# Patient Record
Sex: MALE | Race: WHITE | NOT HISPANIC OR LATINO | Employment: FULL TIME | URBAN - NONMETROPOLITAN AREA
[De-identification: names, ages, dates, MRNs, and addresses within clinical notes are randomized per-mention and may not be internally consistent; named-entity substitution may affect disease eponyms.]

---

## 2018-01-25 ENCOUNTER — OFFICE VISIT CONVERTED (OUTPATIENT)
Dept: FAMILY MEDICINE CLINIC | Age: 38
End: 2018-01-25
Attending: NURSE PRACTITIONER

## 2018-10-15 ENCOUNTER — CONVERSION ENCOUNTER (OUTPATIENT)
Dept: ORTHOPEDIC SURGERY | Facility: CLINIC | Age: 38
End: 2018-10-15

## 2018-10-15 ENCOUNTER — OFFICE VISIT CONVERTED (OUTPATIENT)
Dept: ORTHOPEDIC SURGERY | Facility: CLINIC | Age: 38
End: 2018-10-15
Attending: ORTHOPAEDIC SURGERY

## 2019-01-10 ENCOUNTER — HOSPITAL ENCOUNTER (OUTPATIENT)
Dept: OTHER | Facility: HOSPITAL | Age: 39
Discharge: HOME | End: 2019-01-10
Attending: NURSE PRACTITIONER

## 2019-01-10 LAB
CHOLEST SERPL-MCNC: 245 MG/DL (ref 107–200)
CHOLEST/HDLC SERPL: 6.6 {RATIO} (ref 3–6)
GLUCOSE SERPL-MCNC: 89 MG/DL (ref 70–110)
HDLC SERPL-MCNC: 37 MG/DL (ref 40–60)
LDLC SERPL CALC-MCNC: 144 MG/DL (ref 70–100)
TRIGL SERPL-MCNC: 322 MG/DL (ref 40–150)
VLDLC SERPL-MCNC: 64 MG/DL (ref 5–37)

## 2019-01-29 ENCOUNTER — OFFICE VISIT CONVERTED (OUTPATIENT)
Dept: FAMILY MEDICINE CLINIC | Age: 39
End: 2019-01-29
Attending: NURSE PRACTITIONER

## 2019-01-29 ENCOUNTER — HOSPITAL ENCOUNTER (OUTPATIENT)
Dept: OTHER | Facility: HOSPITAL | Age: 39
Discharge: HOME | End: 2019-01-29
Attending: NURSE PRACTITIONER

## 2019-01-29 LAB
BASOPHILS # BLD MANUAL: 0.09 10*3/UL (ref 0–0.2)
BASOPHILS NFR BLD MANUAL: 1 % (ref 0–3)
DEPRECATED RDW RBC AUTO: 41.2 FL
EOSINOPHIL # BLD MANUAL: 0.22 10*3/UL (ref 0–0.7)
EOSINOPHIL NFR BLD MANUAL: 2.3 % (ref 0–7)
ERYTHROCYTE [DISTWIDTH] IN BLOOD BY AUTOMATED COUNT: 12.6 % (ref 11.5–14.5)
GRANS (ABSOLUTE): 5.94 10*3/UL (ref 2–8)
GRANS: 63.5 % (ref 30–85)
HBA1C MFR BLD: 16.9 G/DL (ref 14–18)
HCT VFR BLD AUTO: 49.3 % (ref 42–52)
IMM GRANULOCYTES # BLD: 0.06 10*3/UL (ref 0–0.54)
IMM GRANULOCYTES NFR BLD: 0.6 % (ref 0–0.43)
LYMPHOCYTES # BLD MANUAL: 2.44 10*3/UL (ref 1–5)
LYMPHOCYTES NFR BLD MANUAL: 6.6 % (ref 3–10)
MCH RBC QN AUTO: 30.4 PG (ref 27–31)
MCHC RBC AUTO-ENTMCNC: 34.3 G/DL (ref 33–37)
MCV RBC AUTO: 88.7 FL (ref 80–96)
MONOCYTES # BLD AUTO: 0.62 10*3/UL (ref 0.2–1.2)
PLATELET # BLD AUTO: 238 10*3/UL (ref 130–400)
PMV BLD AUTO: 10.4 FL (ref 7.4–10.4)
RBC # BLD AUTO: 5.56 10*6/UL (ref 4.7–6.1)
TSH SERPL-ACNC: 3.06 M[IU]/L (ref 0.27–4.2)
VARIANT LYMPHS NFR BLD MANUAL: 26 % (ref 20–45)
WBC # BLD AUTO: 9.37 10*3/UL (ref 4.8–10.8)

## 2019-11-21 ENCOUNTER — HOSPITAL ENCOUNTER (OUTPATIENT)
Dept: OTHER | Facility: HOSPITAL | Age: 39
Discharge: HOME | End: 2019-11-21
Attending: NURSE PRACTITIONER

## 2019-11-21 LAB
CHOLEST SERPL-MCNC: 246 MG/DL (ref 107–200)
CHOLEST/HDLC SERPL: 7.5 {RATIO} (ref 3–6)
GLUCOSE SERPL-MCNC: 92 MG/DL (ref 70–110)
HDLC SERPL-MCNC: 33 MG/DL (ref 40–60)
LDLC SERPL CALC-MCNC: 172 MG/DL (ref 70–100)
TRIGL SERPL-MCNC: 207 MG/DL (ref 40–150)
VLDLC SERPL-MCNC: 41 MG/DL (ref 5–37)

## 2020-01-21 ENCOUNTER — OFFICE VISIT CONVERTED (OUTPATIENT)
Dept: FAMILY MEDICINE CLINIC | Age: 40
End: 2020-01-21
Attending: NURSE PRACTITIONER

## 2020-05-27 ENCOUNTER — OFFICE VISIT CONVERTED (OUTPATIENT)
Dept: FAMILY MEDICINE CLINIC | Age: 40
End: 2020-05-27
Attending: FAMILY MEDICINE

## 2020-09-09 ENCOUNTER — HOSPITAL ENCOUNTER (OUTPATIENT)
Dept: URGENT CARE | Facility: CLINIC | Age: 40
Discharge: HOME | End: 2020-09-09

## 2021-05-16 VITALS — BODY MASS INDEX: 34.4 KG/M2 | OXYGEN SATURATION: 98 % | HEIGHT: 70 IN | WEIGHT: 240.25 LBS | HEART RATE: 80 BPM

## 2021-05-18 NOTE — PROGRESS NOTES
Brandi Tucker 1980     Office/Outpatient Visit    Visit Date: Tue, Jan 29, 2019 03:55 pm    Provider: Fela Hurley N.P. (Assistant: Dimas Kerns)    Location: [facility]        Electronically signed by Fela Hurley N.P. on  01/30/2019 05:12:26 PM                             SUBJECTIVE:        CC:     jr Paz is a 39 year old White male.  physical         HPI:         Patient to be evaluated for health checkup.  His last physical exam was last year.  He's had vision screening done <6 months ago.  He performs testicular self-exams regularly.  He is current with his Td and influenza immunization.      Smoking Status:  Nonsmoker         PHQ-9 Depression Screening: Completed form scanned and in chart; Total Score 0 Alcohol Consumption Screening: Completed form scanned and in chart; Total Score 0     ROS:     CONSTITUTIONAL:  Positive for unintentional weight gain.   Negative for fatigue or fever.      E/N/T:  Negative for hearing problems, E/N/T pain, congestion, rhinorrhea, epistaxis, hoarseness, and dental problems.      CARDIOVASCULAR:  Negative for chest pain, palpitations, tachycardia, orthopnea, and edema.      RESPIRATORY:  Negative for cough, dyspnea, and hemoptysis.      GENITOURINARY:  Negative for dysuria, genital lesions, hematuria, impotence, polyuria, and changes in urine stream.      MUSCULOSKELETAL:  Negative for arthralgias, back pain, and myalgias.      NEUROLOGICAL:  Negative for dizziness, headaches, paresthesias, and weakness.      ENDOCRINE:  Negative for hair loss, heat/cold intolerance, polydipsia, and polyphagia.          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      Positive for Colon Cancer ( pat. uncle ) and Leukemia ( mother ).          Social History:         Marital Status:      Children: 2 children         Tobacco/Alcohol/Supplements:     Last Reviewed on 1/29/2019 03:58 PM by Dimas Kerns    Tobacco: He has a past history of cigarette smoking; quit date:  November 2017.              Current Problems:     Screening for diabetes mellitus     Screening for lipoid disorders         Immunizations:     Boostrix (Tdap) 4/6/2018         Allergies:     Last Reviewed on 1/25/2018 04:22 PM by Andra Laguerre    Penicillins:        Current Medications:     Last Reviewed on 1/29/2019 03:58 PM by Dimas Kerns      No Known Medications.         OBJECTIVE:        Vitals:         Historical:     01/25/2018  BP:   116/81 mm Hg ( (left arm, , sitting, );)     01/25/2018  Wt:   240.5lbs        Current: 1/29/2019 4:01:05 PM    Ht:  5 ft, 10 in;  Wt: 246.4 lbs;  BMI: 35.4    T: 98.8 F (oral);  BP: 139/91 mm Hg (right arm, sitting);  P: 93 bpm (right arm (BP Cuff), sitting)        Exams:     PHYSICAL EXAM:     GENERAL: no apparent distress;     EYES: PERRL, EOMI     E/N/T: EARS: bilateral TMs are normal;  NOSE: normal nasal mucosa; OROPHARYNX: posterior pharynx, including tonsils, tongue, and uvula are normal;     RESPIRATORY: normal respiratory rate and pattern with no distress; normal breath sounds with no rales, rhonchi, wheezes or rubs;     CARDIOVASCULAR: normal rate; rhythm is regular;  no edema;     GASTROINTESTINAL: nontender; normal bowel sounds;     GENITOURINARY: prostate: DECLINES     LYMPHATIC: no enlargement of cervical or facial nodes;     MUSCULOSKELETAL:  Normal range of motion, strength and tone;     NEUROLOGIC: mental status: alert and oriented x 3; GROSSLY INTACT     PSYCHIATRIC:  appropriate affect and demeanor; normal speech pattern; grossly normal memory;         ASSESSMENT           V70.0   Z00.00  Health checkup              DDx:     V79.0    Z13.89  Screening for depression              DDx:     272.4   E78.2  Hyperlipidemia              DDx:     V17.3   Z82.49  Family history of coronary artery disease              DDx:         ORDERS:         Lab Orders:       [id number]  [facility] CBC with 3 part diff  (Send-Out)         [id number]  TSH - [facility] TSH  (Send-Out)         FUTURE  Future order to be done at patients convenience  (Send-Out)         [id number]  [facility] Lipid Panel  (Send-Out)           Other Orders:         Depression screen negative  (In-House)                   PLAN:          Health checkup     LABORATORY:  Labs ordered to be performed today include CBC and TSH.      COUNSELING was provided today regarding the following topics: healthy eating habits, weight loss program, low cholesterol diet, low salt diet, regular exercise, and testicular self-exam.            Orders:       [id number]  [facility] CBC with 3 part diff  (Send-Out)         [id number]  TSH - [facility] TSH  (Send-Out)             Patient Education Handouts:       Physical Exam 40-49 year, Male           Screening for depression     MIPS PHQ-9 Depression Screening Completed form scanned and in chart; Total Score 0           Orders:         Depression screen negative  (In-House)            Hyperlipidemia         RECOMMENDATIONS given include: alcohol avoidance, exercise, low cholesterol/low fat diet, weight loss, and declines statin tx at this time.  wants to work on diet and exercise and repeat fasting lipid panel in 2 months lab only.      FOLLOW-UP TESTING #1: FOLLOW-UP LABORATORY:  Labs to be scheduled in the future include lipid panel.   Patient to schedule in 2 months.            Orders:       FUTURE  Future order to be done at patients convenience  (Send-Out)         [id number]  [facility] Lipid Panel  (Send-Out)            Family history of coronary artery disease discuss risk factors. need to tx for HTN if bp not 130s over 80s or less.  verbalizes understanding.             Patient  Recommendations:        For  Health checkup:     Limit dietary intake of fat (especially saturated fat) and cholesterol.  Eat a variety of foods, including plenty of fruits, vegetables, and grain containg fiber, limit fat intake to 30% of total calories. Balance caloric intake with energy expended. Maintaining regular physical activity is advised to help prevent heart disease, hypertension, diabetes, and obesity.    Testicular cancer is the #1 cancer in men ages 15-35.  You should regularly examine your testicles for knots, lumps, or tenderness. Testicular pain, even if not associated with any masses, needs to be evaluated by your doctor!          For  Hyperlipidemia:     Avoid alcohol as it can contribute to elevated blood pressure. Maintain a regular exercise program. Reduce the amount of cholesterol and saturated fat in your diet. Try to lose some weight; even modest weight reduction can improve your blood pressure.          The following laboratory testing has been ordered: lipid panel Schedule the above testing in 2 months.              CHARGE CAPTURE           **Please note: ICD descriptions below are intended for billing purposes only and may not represent clinical diagnoses**        Primary Diagnosis:         V70.0 Health checkup            Z00.00    Encounter for general adult medical examination without abnormal findings              Orders:          [id number]   Preventive medicine, established patient, age 40-64 years  (In-House)           V79.0 Screening for depression            Z13.89    Encounter for screening for other disorder              Orders:             Depression screen negative  (In-House)           272.4 Hyperlipidemia            E78.2    Mixed hyperlipidemia    V17.3 Family history of coronary artery disease            Z82.49    Family history of ischemic heart disease and other diseases of the circulatory system

## 2021-05-18 NOTE — PROGRESS NOTES
Brandi [place]annapolis 1980     Office/Outpatient Visit    Visit Date: Thu, Jan 25, 2018 04:19 pm    Provider: Fela Hurley N.P. (Assistant: Andra Laguerre MA)    Location: [facility]        Electronically signed by Fela Hurley N.P. on  01/27/2018 07:24:44 PM                             SUBJECTIVE:        CC:     Mr. Paz is a 38 year old male.  Patient is here for yearly PE.          HPI:         Mr. Paz is here for routine periodic health screening and examination.  His last physical exam was 1 year ago.  He has never had a chest x-ray. He has never had an ECG. A hearing test was done 5 years ago and results were normal.   He's had vision screening done 25 years ago and this was normal.      Physical Activity: ** Exercises infrequently; physical job; He is not current with his Td and influenza immunization.      ROS:     CONSTITUTIONAL:  Negative for chills, fatigue, fever, and weight change.      EYES:  Negative for blurred vision, eye pain, and photophobia.      E/N/T:  Negative for hearing problems, E/N/T pain, congestion, rhinorrhea, epistaxis, hoarseness, and dental problems.      CARDIOVASCULAR:  Negative for chest pain, palpitations, tachycardia, orthopnea, and edema.      RESPIRATORY:  Negative for cough, dyspnea, and hemoptysis.      GASTROINTESTINAL:  Positive for acid reflux symptoms ( tums, has seen GI,  constipation  PPis and zantac,  feels as if symptms are stable ).   Negative for constipation, diarrhea, nausea or vomiting.      GENITOURINARY:  Negative for dysuria, genital lesions, hematuria, impotence, polyuria, and changes in urine stream.      MUSCULOSKELETAL:  Negative for arthralgias, back pain, and myalgias.      INTEGUMENTARY:  Negative for rash.      NEUROLOGICAL:  Negative for dizziness, headaches, paresthesias, and weakness.      ENDOCRINE:  Negative for hair loss, heat/cold intolerance, polydipsia, and polyphagia.      ALLERGIC/IMMUNOLOGIC:  Negative for  seasonal allergies.      PSYCHIATRIC:  Negative for anxiety, depression, feelings of stress and sleep disturbance.          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      Positive for Leukemia ( mother ).          Social History:         Marital Status:      Children: 2 children         Tobacco/Alcohol/Supplements:     Last Reviewed on 1/25/2018 04:23 PM by Andra Laguerre    Tobacco: He has a past history of cigarette smoking; quit date:  November 2017.              Current Problems:     Screening for diabetes mellitus     Screening for lipoid disorders         Immunizations:     None        Allergies:     Last Reviewed on 1/25/2018 04:22 PM by Andra Laguerre    Penicillins:        Current Medications:     Last Reviewed on 1/25/2018 04:22 PM by Andra Laguerre    None        OBJECTIVE:        Vitals:         Current: 1/25/2018 4:21:49 PM    Ht:  5 ft, 10 in;  Wt: 240.5 lbs;  BMI: 34.5    T: 98.5 F (oral);  BP: 116/81 mm Hg (left arm, sitting);  P: 101 bpm (left arm (BP Cuff), sitting)        Exams:     PHYSICAL EXAM:     GENERAL:  well developed and nourished; appropriately groomed; in no apparent distress;     EYES: PERRL, EOMI     E/N/T: EARS: both TMs are have fluid behind them;  NOSE: normal nasal mucosa; OROPHARYNX: posterior pharynx shows 2+ tonsils;     RESPIRATORY: normal respiratory rate and pattern with no distress; normal breath sounds with no rales, rhonchi, wheezes or rubs;     CARDIOVASCULAR: normal rate; rhythm is regular;  no edema;     GASTROINTESTINAL: nontender; normal bowel sounds;     LYMPHATIC: no enlargement of cervical or facial nodes;     MUSCULOSKELETAL:  Normal  range of motion, strength and tone;     NEUROLOGIC: mental status: alert and oriented x 3; GROSSLY INTACT     PSYCHIATRIC:  appropriate affect and demeanor; normal speech pattern; grossly normal memory;         ASSESSMENT           V70.0   Z00.00  Annual physical              DDx:         ORDERS:         Other Orders:       [id number]  Behav assmt w/score & docd/stand instrument  (In-House)                   PLAN:          Annual physical     MIPS PHQ-9 Depression Screening: Completed form scanned and in chart; Total Score 0     COUNSELING was provided today regarding the following topics: healthy eating habits, low cholesterol diet, regular exercise, and consider wellbutrin or chantix for smoking cessation.  3-10 minutes on smoking cessation counseling..            Orders:       [id number]  Behav assmt w/score & docd/stand instrument  (In-House)             Patient Education Handouts:       Physical Exam 30-39 year, Male        Heartburn (Gerd)        Smoking Cessation              Patient Recommendations:        For  Annual physical:     Limit dietary intake of fat (especially saturated fat) and cholesterol.  Eat a variety of foods, including plenty of fruits, vegetables, and grain containg fiber, limit fat intake to 30% of total calories. Balance caloric intake with energy expended. Maintaining regular physical activity is advised to help prevent heart disease, hypertension, diabetes, and obesity.              CHARGE CAPTURE           **Please note: ICD descriptions below are intended for billing purposes only and may not represent clinical diagnoses**        Primary Diagnosis:         V70.0 Annual physical            Z00.00    Encntr for general adult medical exam w/o abnormal findings              Orders:          [id number]   Smoking and tobacco use cessation counseling visit; intermediate, > 3 minutes up to 10 minutes  (In-House)             88215   Preventive medicine, established patient, age 18-39 years  (In-House)              09594   Behav assmt w/score & docd/stand instrument  (In-House)

## 2021-05-18 NOTE — PROGRESS NOTES
Darrell Paz  1980     Office/Outpatient Visit    Visit Date: Wed, May 27, 2020 03:42 pm    Provider: Krish Almanzar MD (Assistant: Dimas Kerns, )    Location: [facility]        Electronically signed by Krish Almanzar MD on  08/12/2020 06:24:33 PM                             Subjective:        CC: jr Paz is a 40 year old White male.  presents today due to tick bite on scrotum, thinks he got it on sunday         HPI:       Darrell presents to clinic today for pain and swelling after a tick bite.  He was bitten by a tick on the scrotum on Sunday and since that time he is developed redness and swelling of his scrotum that has extended into his groin and his proximal thigh.  It is minimally tender and nonpruritic.  There are no open lesions or drainage.  He has no fever or chills.  He has had a similar reaction to a tick bite in the past.    ROS:     CONSTITUTIONAL:  Negative for chills, fatigue, fever, and weight change.      EYES:  Negative for blurred vision.      CARDIOVASCULAR:  Negative for chest pain, dizziness, palpitations and edema.      RESPIRATORY:  Negative for dyspnea and cough.      GASTROINTESTINAL:  Negative for abdominal pain, diarrhea, nausea and vomiting.      MUSCULOSKELETAL:  Negative for arthralgias and myalgias.      INTEGUMENTARY:  Positive for Scrotal and groin redness and swelling status post recent tick bite.      NEUROLOGICAL:  Negative for headaches, paresthesias and weakness.          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      Positive for Colon Cancer ( pat. uncle ) and Leukemia ( mother ).          " Social History:         Marital Status:      Children: 2 children         Tobacco/Alcohol/Supplements:     Last Reviewed on 8/12/2020 06:24 PM by Krish Almanzar    Tobacco: He has a past history of cigarette smoking; quit date:  November 2017.          Substance Abuse History:     Last Reviewed on 8/12/2020 06:24 PM by Krish Almanzar        Mental Health History:     Last Reviewed on 8/12/2020 06:24 PM by Krish Almanzar        Communicable Diseases (eg STDs):     Last Reviewed on 8/12/2020 06:24 PM by Krish Almanzar        Current Problems:     Last Reviewed on 8/12/2020 06:24 PM by Krish Almanzar    Encounter for screening for lipoid disorders    Family history of ischemic heart disease and other diseases of the circulatory system    Mixed hyperlipidemia    Hyperlipidemia, unspecified    Pain in right leg    Pain in left leg    Pain in right foot    Other fatigue    Encounter for general adult medical examination without abnormal findings    Encounter for screening for depression    Bitten or stung by nonvenomous insect and other nonvenomous arthropods, initial encounter        Immunizations:     influenza, injectable, quadrivalent, preservative free 1/7/2020    Fluzone Quadrivalent (3+ years) 11/29/2018    Boostrix (Tdap) 4/6/2018        Allergies:     Last Reviewed on 8/12/2020 06:24 PM by Krish Almanzar    Penicillins:          Current Medications:     Last Reviewed on 8/12/2020 06:24 PM by Krish Almanzar    No Known Medications.    meloxicam 7.5 mg oral tablet        Objective:        Vitals:         Current: 5/27/2020 3:45:02 PM    Ht:  5 ft, 10 in;  Wt: 234.2 lbs;  BMI: 33.6T: 98.5 F (oral);  BP: 131/94 mm Hg (left arm, sitting);  P: 91 bpm (left arm (BP Cuff), sitting)        Exams:     PHYSICAL EXAM:     GENERAL: Vitals recorded well developed, well nourished;  no apparent distress;     EYES: conjunctiva and cornea are normal;     RESPIRATORY: Clear to auscultation bilateally; no rales (\"crackles\") " present; no rhonchi; no wheezes;     CARDIOVASCULAR: normal rate; rhythm is regular;  No murmurs, clicks, gallops or rubs appreciated; no edema;     SKIN: Erythema and mild edema of left hemiscrotum extending to the left inguinal region and proximal thigh. There is no fluctuance.;     NEUROLOGIC: mental status: alert and oriented x 3; Grossly intact;     PSYCHIATRIC: appropriate affect and demeanor; normal speech pattern; Normal behavior;         Assessment:         W57.XXXA   Bitten or stung by nonvenomous insect and other nonvenomous arthropods, initial encounter           ORDERS:         Meds Prescribed:       [New Rx] doxycycline monohydrate 100 mg oral capsule [take 1 capsule (100 mg) by oral route 2 times per day], #28 (twenty eight) capsules, Refills: 0 (zero)                 Plan:         Bitten or stung by nonvenomous insect and other nonvenomous arthropods, initial encounter- Differential diagnosis includes cellulitis versus allergic reaction to tick bite.  Will treat for cellulitis at this time with doxycycline as this would cover both cellulitis and any potential Lyme disease.  If no improvement of the rash within 48 to 72 hours, will consider prednisone for possible allergic reaction.  ED/return precautions given.          Prescriptions:       [New Rx] doxycycline monohydrate 100 mg oral capsule [take 1 capsule (100 mg) by oral route 2 times per day], #28 (twenty eight) capsules, Refills: 0 (zero)             Charge Capture:         Primary Diagnosis:     W57.XXXA  Bitten or stung by nonvenomous insect and other nonvenomous arthropods, initial encounter           Orders:      [id number]  Office/outpatient visit; established patient, level 3  (In-House)

## 2021-05-18 NOTE — PROGRESS NOTES
Darrell Paz  1980     Office/Outpatient Visit    Visit Date: Tue, Jan 21, 2020 12:05 pm    Provider: Fela Hurley N.P. (Assistant: Spurling, Sarah C, MA)    Location: [facility]        Electronically signed by Fela Hurley N.P. on  01/21/2020 08:29:55 PM                             Subjective:        CC: jr Paz is a 40 year old White male.  PREVENATIVE EXAM.          HPI:           Patient to be evaluated for encounter for general adult medical examination without abnormal findings.  His last physical exam was 1 year ago.  He underwent 10 years ago with normal results.   He's had vision screening done in 2019 and this was abnormal, but is corrected with glasses.  He is current with his Td and influenza immunization.      Smoking status: Former smoker           PHQ-9 Depression Screening: Completed form scanned and in chart; Total Score 2     ROS:     CONSTITUTIONAL:  Positive for fatigue ( moderate ).   Negative for fever.      EYES:  Positive for use of glasses or contacts and not wearing currently.      E/N/T:  Negative for hearing problems, E/N/T pain, congestion, rhinorrhea, epistaxis, hoarseness, and dental problems.      RESPIRATORY:  Negative for cough, dyspnea, and hemoptysis.      GASTROINTESTINAL:  Positive for acid reflux symptoms ( tried many meds,  saw GI dr romero.  stable but not resolved.  eats once daily as a result. ) and diarrhea ( since cholecystectomy ).   Negative for dysphagia, constipation, hematochezia, nausea or vomiting.      GENITOURINARY:  Negative for dysuria, genital lesions, hematuria, impotence, polyuria, and changes in urine stream.      MUSCULOSKELETAL:  Positive for limb pain ( bilateral leg pain ) and right foot pain along arch leading to heel.  denies injury..   Negative for myalgias.      INTEGUMENTARY:  Negative for rash.      NEUROLOGICAL:  Negative for dizziness, headaches, paresthesias, and weakness.      PSYCHIATRIC:  Negative for  anxiety, depression, and sleep disturbances.          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      Positive for Colon Cancer ( pat. uncle ) and Leukemia ( mother ).          Social History:         Marital Status:      Children: 2 children         Tobacco/Alcohol/Supplements:     Last Reviewed on 1/21/2020 12:09 PM by Spurling, Sarah C    Tobacco: He has a past history of cigarette smoking; quit date:  November 2017.          Current Problems:     Last Reviewed on 1/21/2020 12:06 PM by Spurling, Sarah C    Encounter for screening for lipoid disorders    Mixed hyperlipidemia    Family history of ischemic heart disease and other diseases of the circulatory system        Immunizations:     Fluzone Quadrivalent (3+ years) 11/29/2018    Boostrix (Tdap) 4/6/2018        Allergies:     Last Reviewed on 1/21/2020 12:06 PM by Spurling, Sarah C    Penicillins:          Current Medications:     Last Reviewed on 1/21/2020 12:06 PM by Spurling, Sarah C    No Known Medications.        Objective:        Vitals:         Historical:     1/29/2019  BP:   139/91 mm Hg ( (right arm, , sitting, );) 1/29/2019  Wt:   246.4lbs    Current: 1/21/2020 12:11:49 PM    Ht:  5 ft, 10 in;  Wt: 253.8 lbs;  BMI: 36.4T: 98.5 F (oral);  BP: 135/90 mm Hg (left arm, sitting);  P: 77 bpm (left arm (BP Cuff), sitting)        Repeat:     12:41:20 PM  BP:   128/88mm Hg (right arm, sitting)     Exams:     PHYSICAL EXAM:     GENERAL: obese;  well groomed;  no apparent distress;     EYES: PERRL, EOMI     E/N/T: EARS: bilateral TMs are normal;  NOSE: normal nasal mucosa; OROPHARYNX: posterior  pharynx, including tonsils, tongue, and uvula are normal;     RESPIRATORY: normal respiratory rate and pattern with no distress; normal breath sounds with no rales, rhonchi, wheezes or rubs;     CARDIOVASCULAR: normal rate; rhythm is regular;     GASTROINTESTINAL: nontender; normal bowel sounds; no organomegaly;     GENITOURINARY: prostate: DECLINES     LYMPHATIC: no enlargement of cervical or facial nodes;     MUSCULOSKELETAL: normal gait; normal range of motion of all major muscle groups; no limb or joint pain with range of motion; normal overall tone declines right foot exam;     NEUROLOGIC: mental status: alert and oriented x 3; GROSSLY INTACT     PSYCHIATRIC:  appropriate affect and demeanor; normal speech pattern; grossly normal memory;         Assessment:         Z00.00   Encounter for general adult medical examination without abnormal findings       Z13.31   Encounter for screening for depression       M79.671   Pain in right foot       M79.604   Pain in right leg       M79.605   Pain in left leg       R53.83   Other fatigue       E78.5   Hyperlipidemia, unspecified           ORDERS:         Lab Orders:       FUTURE  Future order to be done at patients convenience  (Send-Out)            55985  LPDP - [facility] Lipid Panel  (Send-Out)            FUTURE  Future order to be done at patients convenience  (Send-Out)            48586  VB12 - [facility] Vitamin B12  (Send-Out)            FUTURE  Future order to be done at patients convenience  (Send-Out)            16358  VITD - [facility] Vitamin D, 25 Hydroxy  (Send-Out)              Other Orders:         Depression screen negative  (In-House)                      Plan: colonosocpy dr romero 10 yrs ago        Encounter for general adult medical examination without abnormal findings        COUNSELING was provided today regarding the following topics: healthy eating habits, weight loss program, low cholesterol diet, low salt diet, and regular exercise.            Patient  Education Handouts:       Physical Exam 40-49 year, Male          Encounter for screening for depression    MIPS PHQ-9 Depression Screening: Completed form scanned and in chart; Total Score 2; Negative Depression Screen           Orders:         Depression screen negative  (In-House)              Pain in right foot        RECOMMENDATIONS given include: discuss conservatibve measures for plantar fasciitis.  declines rx oral or topical nsaid.  consider xray right foot and referral to podiatry.  declines..            Patient Education Handouts:       Plantar Fasciitis          Pain in left leghigh risk for vitamin deficiency due to chronic GI complaints.        FOLLOW-UP TESTING #1: FOLLOW-UP LABORATORY:  Labs to be scheduled in the future include Vitamin D 25.            Orders:       FUTURE  Future order to be done at patients convenience  (Send-Out)            20449  VITD - [facility] Vitamin D, 25 Hydroxy  (Send-Out)              Other fatigue        FOLLOW-UP TESTING #1: FOLLOW-UP LABORATORY:  Labs to be scheduled in the future include Vitamin B12.            Orders:       FUTURE  Future order to be done at patients convenience  (Send-Out)            [address]  [address] Vitamin B12  (Send-Out)              Hyperlipidemia, unspecified        FOLLOW-UP TESTING #1: FOLLOW-UP LABORATORY:  Labs to be scheduled in the future include lipid panel.   Patient to schedule in 2 months.      RECOMMENDATIONS given include: alcohol avoidance, exercise, low cholesterol/low fat diet, weight loss, and stong family hx.  cx risk 3.6%.  due to family hx strongly recommend pravastatin or could try colestipol which may also help GI symptoms.  pt decliens today.  will consider..            Orders:       FUTURE  Future order to be done at patients convenience  (Send-Out)            08991  [facility] Lipid Panel  (Send-Out)                  Patient Recommendations:        For  Encounter for general adult medical examination without abnormal  findings:    Limit dietary intake of fat (especially saturated fat) and cholesterol.  Eat a variety of foods, including plenty of fruits, vegetables, and grain containg fiber, limit fat intake to 30% of total calories. Balance caloric intake with energy expended. Maintaining regular physical activity is advised to help prevent heart disease, hypertension, diabetes, and obesity.          For  Pain in left leg:            The following laboratory testing has been ordered:         For  Other fatigue:            The following laboratory testing has been ordered:         For  Hyperlipidemia, unspecified:            The following laboratory testing has been ordered: lipid panel Schedule the above testing in 2 months.  Avoid alcohol as it can contribute to elevated blood pressure. Maintain a regular exercise program. Reduce the amount of cholesterol and saturated fat in your diet. Try to lose some weight; even modest weight reduction can improve your blood pressure.              Charge Capture:         Primary Diagnosis:     Z00.00  Encounter for general adult medical examination without abnormal findings           Orders:      32457  Preventive medicine, established patient, age 40-64 years  (In-House)              Z13.31  Encounter for screening for depression           Orders:      65389-47  Office/outpatient visit; established patient, level 3  (In-House)              Depression screen negative  (In-House)              M79.671  Pain in right foot     M79.604  Pain in right leg     M79.605  Pain in left leg     R53.83  Other fatigue     E78.5  Hyperlipidemia, unspecified

## 2021-07-01 VITALS
HEIGHT: 70 IN | TEMPERATURE: 98.5 F | HEART RATE: 101 BPM | DIASTOLIC BLOOD PRESSURE: 81 MMHG | SYSTOLIC BLOOD PRESSURE: 116 MMHG | WEIGHT: 240.5 LBS | BODY MASS INDEX: 34.43 KG/M2

## 2021-07-01 VITALS
HEIGHT: 70 IN | BODY MASS INDEX: 35.28 KG/M2 | WEIGHT: 246.4 LBS | HEART RATE: 93 BPM | DIASTOLIC BLOOD PRESSURE: 91 MMHG | TEMPERATURE: 98.8 F | SYSTOLIC BLOOD PRESSURE: 139 MMHG

## 2021-07-02 VITALS
HEIGHT: 70 IN | BODY MASS INDEX: 33.53 KG/M2 | WEIGHT: 234.2 LBS | HEART RATE: 91 BPM | SYSTOLIC BLOOD PRESSURE: 131 MMHG | DIASTOLIC BLOOD PRESSURE: 94 MMHG | TEMPERATURE: 98.5 F

## 2021-07-02 VITALS
HEIGHT: 70 IN | HEART RATE: 77 BPM | SYSTOLIC BLOOD PRESSURE: 128 MMHG | DIASTOLIC BLOOD PRESSURE: 88 MMHG | WEIGHT: 253.8 LBS | BODY MASS INDEX: 36.33 KG/M2 | TEMPERATURE: 98.5 F

## 2021-08-13 ENCOUNTER — HOSPITAL ENCOUNTER (OUTPATIENT)
Dept: GENERAL RADIOLOGY | Facility: HOSPITAL | Age: 41
Discharge: HOME | End: 2021-08-13
Admitting: NURSE PRACTITIONER

## 2021-08-13 ENCOUNTER — OFFICE VISIT (OUTPATIENT)
Dept: FAMILY MEDICINE CLINIC | Age: 41
End: 2021-08-13

## 2021-08-13 VITALS
BODY MASS INDEX: 32.44 KG/M2 | DIASTOLIC BLOOD PRESSURE: 84 MMHG | HEART RATE: 91 BPM | TEMPERATURE: 99.4 F | WEIGHT: 226.6 LBS | SYSTOLIC BLOOD PRESSURE: 140 MMHG | HEIGHT: 70 IN

## 2021-08-13 DIAGNOSIS — M79.632 PAIN OF LEFT FOREARM: Primary | ICD-10-CM

## 2021-08-13 DIAGNOSIS — I10 ESSENTIAL HYPERTENSION: ICD-10-CM

## 2021-08-13 DIAGNOSIS — M79.632 PAIN OF LEFT FOREARM: ICD-10-CM

## 2021-08-13 PROBLEM — M79.602 PAIN OF LEFT UPPER EXTREMITY: Status: ACTIVE | Noted: 2021-08-13

## 2021-08-13 PROBLEM — K21.9 ESOPHAGEAL REFLUX: Status: ACTIVE | Noted: 2021-08-13

## 2021-08-13 PROCEDURE — 73090 X-RAY EXAM OF FOREARM: CPT

## 2021-08-13 PROCEDURE — 99213 OFFICE O/P EST LOW 20 MIN: CPT | Performed by: NURSE PRACTITIONER

## 2021-08-13 PROCEDURE — 96372 THER/PROPH/DIAG INJ SC/IM: CPT | Performed by: NURSE PRACTITIONER

## 2021-08-13 RX ORDER — KETOROLAC TROMETHAMINE 30 MG/ML
60 INJECTION, SOLUTION INTRAMUSCULAR; INTRAVENOUS ONCE
Status: COMPLETED | OUTPATIENT
Start: 2021-08-13 | End: 2021-08-13

## 2021-08-13 RX ORDER — METHYLPREDNISOLONE 4 MG/1
TABLET ORAL
Qty: 21 TABLET | Refills: 0 | Status: SHIPPED | OUTPATIENT
Start: 2021-08-13 | End: 2022-08-08

## 2021-08-13 RX ORDER — LISINOPRIL 10 MG/1
10 TABLET ORAL DAILY
Qty: 30 TABLET | Refills: 5 | OUTPATIENT
Start: 2021-08-13 | End: 2022-01-17

## 2021-08-13 RX ADMIN — KETOROLAC TROMETHAMINE 60 MG: 30 INJECTION, SOLUTION INTRAMUSCULAR; INTRAVENOUS at 17:08

## 2021-08-13 NOTE — PROGRESS NOTES
"Chief Complaint  Arm Pain (Left)    Subjective  Darrell is a 41-year-old male here today for left lower arm pain x2 months.  No known injury.  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  No interventions have been attempted.  He states his blood pressure also tends to stay a little elevated similar to when he first arrived in the office today and has a strong family history of hypertension.  He denies a high salt diet.  Would like to get treatment for hypertension.        Darrell Paz presents to [facility]    Review of Systems   Constitutional: Negative.    Respiratory: Negative.    Cardiovascular: Negative.    Musculoskeletal: Positive for arthralgias and myalgias.   Neurological: Negative for dizziness.        Objective   Vital Signs:   Vitals:    08/13/21 1515 08/13/21 1549   BP: 149/99 140/84   BP Location: Right arm Right arm   Patient Position: Sitting Sitting   Cuff Size:  Large Adult   Pulse: 91    Temp: 99.4 °F (37.4 °C)    TempSrc: Oral    Weight: 103 kg (226 lb 9.6 oz)    Height: 177.8 cm (70\")       Physical Exam  Vitals reviewed.   Constitutional:       General: He is not in acute distress.     Appearance: Normal appearance. He is well-developed.   Cardiovascular:      Rate and Rhythm: Normal rate and regular rhythm.      Pulses:           Radial pulses are 2+ on the left side.      Heart sounds: Normal heart sounds.   Pulmonary:      Effort: Pulmonary effort is normal.      Breath sounds: Normal breath sounds.   Musculoskeletal:      Right lower leg: No edema.      Left lower leg: No edema.   Skin:     General: Skin is warm and dry.   Neurological:      General: No focal deficit present.      Mental Status: He is alert.   Psychiatric:         Attention and Perception: Attention normal.         Mood and Affect: Mood and affect normal.         Behavior: Behavior normal.          Result Review :                Assessment and Plan "    Diagnoses and all orders for this visit:    1. Pain of left forearm (Primary)  Assessment & Plan:  Rest and ice and take medicines as directed.  X-ray is pending.  Consider physical therapy or referral to orthopedics if not improving.    Orders:  -     XR Forearm 2 View Left; Future  -     ketorolac (TORADOL) injection 60 mg  -     methylPREDNISolone (MEDROL) 4 MG dose pack; Take as directed on package instructions.  Dispense: 21 tablet; Refill: 0    2. Essential hypertension  Assessment & Plan:  Advise a lower sodium diet and monitor blood pressure at home.  We will start lisinopril 10 mg once daily.  Report dry cough if it were to occur.  And follow-up in 6 weeks for reevaluation or sooner if concerns    Orders:  -     lisinopril (PRINIVIL,ZESTRIL) 10 MG tablet; Take 1 tablet by mouth Daily.  Dispense: 30 tablet; Refill: 5      Follow Up    Return in about 6 weeks (around 9/24/2021).  Patient was given instructions and counseling regarding his condition or for health maintenance advice. Please see specific information pulled into the AVS if appropriate.

## 2021-08-13 NOTE — PATIENT INSTRUCTIONS
Tennis Elbow  Tennis elbow is swelling (inflammation) in your outer forearm, near your elbow. Swelling affects the tissues that connect muscle to bone (tendons). Tennis elbow can happen in any sport or job in which you use your elbow too much. It is caused by doing the same motion over and over. Tennis elbow can cause:  · Pain and tenderness in your forearm and the outer part of your elbow. You may have pain all the time, or only when using the arm.  · A burning feeling. This runs from your elbow through your arm.  · Weak  in your hand.  Follow these instructions at home:  Activity  · Rest your elbow and wrist. Avoid activities that cause problems, as told by your doctor.  · If told by your doctor, wear an elbow strap to reduce stress on the area.  · Do physical therapy exercises as told.  · If you lift an object, lift it with your palm facing up. This is easier on your elbow.  Lifestyle  · If your tennis elbow is caused by sports, check your equipment and make sure that:  ? You are using it correctly.  ? It fits you well.  · If your tennis elbow is caused by work or by using a computer, take breaks often to stretch your arm. Talk with your manager about how you can manage your condition at work.  If you have a brace:  · Wear the brace as told by your doctor. Remove it only as told by your doctor.  · Loosen the brace if your fingers tingle, get numb, or turn cold and blue.  · Keep the brace clean.  · If the brace is not waterproof, ask your doctor if you may take the brace off for bathing. If you must keep the brace on while bathing:  ? Do not let it get wet.  ? Cover it with a watertight covering when you take a bath or a shower.  General instructions    · If told, put ice on the painful area:  ? Put ice in a plastic bag.  ? Place a towel between your skin and the bag.  ? Leave the ice on for 20 minutes, 2-3 times a day.  · Take over-the-counter and prescription medicines only as told by your doctor.  · Keep  "all follow-up visits as told by your doctor. This is important.  Contact a doctor if:  · Your pain does not get better with treatment.  · Your pain gets worse.  · You have weakness in your forearm, hand, or fingers.  · You cannot feel your forearm, hand, or fingers.  Summary  · Tennis elbow is swelling (inflammation) in your outer forearm, near your elbow.  · Tennis elbow is caused by doing the same motion over and over.  · Rest your elbow and wrist. Avoid activities that cause problems, as told by your doctor.  · If told, put ice on the painful area for 20 minutes, 2-3 times a day.  This information is not intended to replace advice given to you by your health care provider. Make sure you discuss any questions you have with your health care provider.  Document Revised: 09/13/2019 Document Reviewed: 10/02/2018  [employer] Patient Education © 2021 [employer]  Hypertension, Adult  High blood pressure (hypertension) is when the force of blood pumping through the arteries is too strong. The arteries are the blood vessels that carry blood from the heart throughout the body. Hypertension forces the heart to work harder to pump blood and may cause arteries to become narrow or stiff. Untreated or uncontrolled hypertension can cause a heart attack, heart failure, a stroke, kidney disease, and other problems.  A blood pressure reading consists of a higher number over a lower number. Ideally, your blood pressure should be below 120/80. The first (\"top\") number is called the systolic pressure. It is a measure of the pressure in your arteries as your heart beats. The second (\"bottom\") number is called the diastolic pressure. It is a measure of the pressure in your arteries as the heart relaxes.  What are the causes?  The exact cause of this condition is not known. There are some conditions that result in or are related to high blood pressure.  What increases the risk?  Some risk factors for high blood pressure are under your " control. The following factors may make you more likely to develop this condition:  · Smoking.  · Having type 2 diabetes mellitus, high cholesterol, or both.  · Not getting enough exercise or physical activity.  · Being overweight.  · Having too much fat, sugar, calories, or salt (sodium) in your diet.  · Drinking too much alcohol.  Some risk factors for high blood pressure may be difficult or impossible to change. Some of these factors include:  · Having chronic kidney disease.  · Having a family history of high blood pressure.  · Age. Risk increases with age.  · Race. You may be at higher risk if you are .  · Gender. Men are at higher risk than women before age 45. After age 65, women are at higher risk than men.  · Having obstructive sleep apnea.  · Stress.  What are the signs or symptoms?  High blood pressure may not cause symptoms. Very high blood pressure (hypertensive crisis) may cause:  · Headache.  · Anxiety.  · Shortness of breath.  · Nosebleed.  · Nausea and vomiting.  · Vision changes.  · Severe chest pain.  · Seizures.  How is this diagnosed?  This condition is diagnosed by measuring your blood pressure while you are seated, with your arm resting on a flat surface, your legs uncrossed, and your feet flat on the floor. The cuff of the blood pressure monitor will be placed directly against the skin of your upper arm at the level of your heart. It should be measured at least twice using the same arm. Certain conditions can cause a difference in blood pressure between your right and left arms.  Certain factors can cause blood pressure readings to be lower or higher than normal for a short period of time:  · When your blood pressure is higher when you are in a health care provider's office than when you are at home, this is called white coat hypertension. Most people with this condition do not need medicines.  · When your blood pressure is higher at home than when you are in a health care  provider's office, this is called masked hypertension. Most people with this condition may need medicines to control blood pressure.  If you have a high blood pressure reading during one visit or you have normal blood pressure with other risk factors, you may be asked to:  · Return on a different day to have your blood pressure checked again.  · Monitor your blood pressure at home for 1 week or longer.  If you are diagnosed with hypertension, you may have other blood or imaging tests to help your health care provider understand your overall risk for other conditions.  How is this treated?  This condition is treated by making healthy lifestyle changes, such as eating healthy foods, exercising more, and reducing your alcohol intake. Your health care provider may prescribe medicine if lifestyle changes are not enough to get your blood pressure under control, and if:  · Your systolic blood pressure is above 130.  · Your diastolic blood pressure is above 80.  Your personal target blood pressure may vary depending on your medical conditions, your age, and other factors.  Follow these instructions at home:  Eating and drinking    · Eat a diet that is high in fiber and potassium, and low in sodium, added sugar, and fat. An example eating plan is called the DASH (Dietary Approaches to Stop Hypertension) diet. To eat this way:  ? Eat plenty of fresh fruits and vegetables. Try to fill one half of your plate at each meal with fruits and vegetables.  ? Eat whole grains, such as whole-wheat pasta, brown rice, or whole-grain bread. Fill about one fourth of your plate with whole grains.  ? Eat or drink low-fat dairy products, such as skim milk or low-fat yogurt.  ? Avoid fatty cuts of meat, processed or cured meats, and poultry with skin. Fill about one fourth of your plate with lean proteins, such as fish, chicken without skin, beans, eggs, or tofu.  ? Avoid pre-made and processed foods. These tend to be higher in sodium, added  sugar, and fat.  · Reduce your daily sodium intake. Most people with hypertension should eat less than 1,500 mg of sodium a day.  · Do not drink alcohol if:  ? Your health care provider tells you not to drink.  ? You are pregnant, may be pregnant, or are planning to become pregnant.  · If you drink alcohol:  ? Limit how much you use to:  § 0-1 drink a day for women.  § 0-2 drinks a day for men.  ? Be aware of how much alcohol is in your drink. In the [place] one drink equals one 12 oz bottle of beer (355 mL), one 5 oz glass of wine (148 mL), or one 1½ oz glass of hard liquor (44 mL).  Lifestyle    · Work with your health care provider to maintain a healthy body weight or to lose weight. Ask what an ideal weight is for you.  · Get at least 30 minutes of exercise most days of the week. Activities may include walking, swimming, or biking.  · Include exercise to strengthen your muscles (resistance exercise), such as Pilates or lifting weights, as part of your weekly exercise routine. Try to do these types of exercises for 30 minutes at least 3 days a week.  · Do not use any products that contain nicotine or tobacco, such as cigarettes, e-cigarettes, and chewing tobacco. If you need help quitting, ask your health care provider.  · Monitor your blood pressure at home as told by your health care provider.  · Keep all follow-up visits as told by your health care provider. This is important.  Medicines  · Take over-the-counter and prescription medicines only as told by your health care provider. Follow directions carefully. Blood pressure medicines must be taken as prescribed.  · Do not skip doses of blood pressure medicine. Doing this puts you at risk for problems and can make the medicine less effective.  · Ask your health care provider about side effects or reactions to medicines that you should watch for.  Contact a health care provider if you:  · Think you are having a reaction to a medicine you are taking.  · Have  headaches that keep coming back (recurring).  · Feel dizzy.  · Have swelling in your ankles.  · Have trouble with your vision.  Get help right away if you:  · Develop a severe headache or confusion.  · Have unusual weakness or numbness.  · Feel faint.  · Have severe pain in your chest or abdomen.  · Vomit repeatedly.  · Have trouble breathing.  Summary  · Hypertension is when the force of blood pumping through your arteries is too strong. If this condition is not controlled, it may put you at risk for serious complications.  · Your personal target blood pressure may vary depending on your medical conditions, your age, and other factors. For most people, a normal blood pressure is less than 120/80.  · Hypertension is treated with lifestyle changes, medicines, or a combination of both. Lifestyle changes include losing weight, eating a healthy, low-sodium diet, exercising more, and limiting alcohol.  This information is not intended to replace advice given to you by your health care provider. Make sure you discuss any questions you have with your health care provider.  Document Revised: 08/28/2019 Document Reviewed: 08/28/2019  [employer] Patient Education © 2021 [employer]

## 2021-08-16 DIAGNOSIS — M79.632 PAIN OF LEFT FOREARM: Primary | ICD-10-CM

## 2021-08-16 NOTE — PROGRESS NOTES
Normal xray left forearm.  Continue tx for tendinitis as discussed.  Advise referral to PT.  If not improving after PT will need to consider MRI left forearm. Or referral to orthopedics

## 2021-08-17 ENCOUNTER — TELEPHONE (OUTPATIENT)
Dept: FAMILY MEDICINE CLINIC | Age: 41
End: 2021-08-17

## 2021-08-17 DIAGNOSIS — M79.602 PAIN OF LEFT UPPER EXTREMITY: Primary | ICD-10-CM

## 2021-08-17 NOTE — ASSESSMENT & PLAN NOTE
Rest and ice and take medicines as directed.  X-ray is pending.  Consider physical therapy or referral to orthopedics if not improving.

## 2021-08-17 NOTE — ASSESSMENT & PLAN NOTE
Advise a lower sodium diet and monitor blood pressure at home.  We will start lisinopril 10 mg once daily.  Report dry cough if it were to occur.  And follow-up in 6 weeks for reevaluation or sooner if concerns

## 2021-08-17 NOTE — TELEPHONE ENCOUNTER
----- Message from Claudette Hurley sent at 8/16/2021  1:43 PM EDT -----  Inf pt/he is agreeable to going to Pt/pended PT order

## 2021-09-07 ENCOUNTER — TELEPHONE (OUTPATIENT)
Dept: FAMILY MEDICINE CLINIC | Age: 41
End: 2021-09-07

## 2021-09-07 NOTE — TELEPHONE ENCOUNTER
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

## 2021-09-08 DIAGNOSIS — M79.602 PAIN OF LEFT UPPER EXTREMITY: Primary | ICD-10-CM

## 2021-09-16 ENCOUNTER — OFFICE VISIT (OUTPATIENT)
Dept: ORTHOPEDIC SURGERY | Facility: CLINIC | Age: 41
End: 2021-09-16

## 2021-09-16 VITALS — BODY MASS INDEX: 32.58 KG/M2 | HEART RATE: 84 BPM | OXYGEN SATURATION: 98 % | WEIGHT: 227.6 LBS | HEIGHT: 70 IN

## 2021-09-16 DIAGNOSIS — M79.632 LEFT FOREARM PAIN: Primary | ICD-10-CM

## 2021-09-16 DIAGNOSIS — M77.8 TENDONITIS OF ELBOW, LEFT: ICD-10-CM

## 2021-09-16 PROCEDURE — 99213 OFFICE O/P EST LOW 20 MIN: CPT | Performed by: ORTHOPAEDIC SURGERY

## 2021-09-16 RX ORDER — NAPROXEN 500 MG/1
500 TABLET ORAL 2 TIMES DAILY
Qty: 60 TABLET | Refills: 0 | Status: SHIPPED | OUTPATIENT
Start: 2021-09-16 | End: 2022-08-08

## 2021-09-16 NOTE — PROGRESS NOTES
"Chief Complaint  Pain of the Left Elbow     Subjective      Darrell Paz presents to [facility] for evaluation of the left elbow. The patient reports left elbow pain. He has no injury or trauma to the left elbow. He has been having elbow pain for several months. He locates the pain to the dorsal radial forearm. He was prescribed prednisone without relief. He has had an injection without relief.     Allergies   Allergen Reactions   • Penicillins Anaphylaxis        Social History     Socioeconomic History   • Marital status:      Spouse name: Not on file   • Number of children: 2   • Years of education: Not on file   • Highest education level: Not on file   Tobacco Use   • Smoking status: Former Smoker     Packs/day: 1.00     Years: 25.00     Pack years: 25.00     Types: Cigarettes     Quit date: 11/2017     Years since quitting: 3.8   • Smokeless tobacco: Never Used        Review of Systems     Objective   Vital Signs:   Pulse 84   Ht 177.8 cm (70\")   Wt 103 kg (227 lb 9.6 oz)   SpO2 98%   BMI 32.66 kg/m²       Physical Exam  Constitutional:       Appearance: Normal appearance. The patient is well-developed and normal weight.   HENT:      Head: Normocephalic.      Right Ear: Hearing and external ear normal.      Left Ear: Hearing and external ear normal.      Nose: Nose normal.   Eyes:      Conjunctiva/sclera: Conjunctivae normal.   Cardiovascular:      Rate and Rhythm: Normal rate.   Pulmonary:      Effort: Pulmonary effort is normal.      Breath sounds: No wheezing or rales.   Abdominal:      Palpations: Abdomen is soft.      Tenderness: There is no abdominal tenderness.   Musculoskeletal:      Cervical back: Normal range of motion.   Skin:     Findings: No rash.   Neurological:      Mental Status: The patient is alert and oriented to person, place, and time.   Psychiatric:         Mood and Affect: Mood and affect normal.         Judgment: Judgment normal.       Ortho " Exam      Left elbow- No elbow tenderness to elbow or biceps tendon. Tender to dorsal radial forearm. Pain with resisted and active movement of the wrist. Elbow ROM 0-145 degrees. Neurovascularly intact.     Procedures    Imaging Results (Most Recent)     None           Result Review :       No results found.      X-ray [facility] 8/2021- CONCLUSION:   1. No acute osseous abnormality of the left forearm     Assessment and Plan     DX: Left forearm pain and tendonitis      Discussed the treatment plan with the patient.  Plan for MRI of the left elbow to rule out internal derangement. Prescription for Naproxen given today.       Call or return if worsening symptoms.    Follow Up     After MRI      Patient was given instructions and counseling regarding his condition or for health maintenance advice. Please see specific information pulled into the AVS if appropriate.     Scribed for Dion Cartagnea MD by Dona Laguerre.  09/16/21   16:13 EDT    I have personally performed the services described in this document as scribed by the above individual and it is both accurate and complete. Dion Cartagena MD 09/18/21

## 2021-10-01 ENCOUNTER — HOSPITAL ENCOUNTER (OUTPATIENT)
Dept: MRI IMAGING | Facility: HOSPITAL | Age: 41
Discharge: HOME | End: 2021-10-01
Admitting: ORTHOPAEDIC SURGERY

## 2021-10-01 DIAGNOSIS — M79.632 LEFT FOREARM PAIN: ICD-10-CM

## 2021-10-01 DIAGNOSIS — M77.8 TENDONITIS OF ELBOW, LEFT: ICD-10-CM

## 2021-10-01 PROCEDURE — 73218 MRI UPPER EXTREMITY W/O DYE: CPT

## 2021-10-06 ENCOUNTER — OFFICE VISIT (OUTPATIENT)
Dept: ORTHOPEDIC SURGERY | Facility: CLINIC | Age: 41
End: 2021-10-06

## 2021-10-06 VITALS — WEIGHT: 225 LBS | RESPIRATION RATE: 14 BRPM | HEIGHT: 70 IN | BODY MASS INDEX: 32.21 KG/M2

## 2021-10-06 DIAGNOSIS — M25.522 LEFT ELBOW PAIN: ICD-10-CM

## 2021-10-06 DIAGNOSIS — M71.9 BURSITIS OF OTHER SITE: Primary | ICD-10-CM

## 2021-10-06 PROCEDURE — 99213 OFFICE O/P EST LOW 20 MIN: CPT | Performed by: ORTHOPAEDIC SURGERY

## 2021-10-06 NOTE — PROGRESS NOTES
"Chief Complaint  Pain of the Left Arm     Subjective      Darrell Paz presents to [facility] for follow up evaluation of the left elbow. The patient reports left elbow pain. He has no injury or trauma to the left elbow. He has been having elbow pain for several months. He locates the pain to the dorsal radial forearm. He was prescribed prednisone without relief. He has had an injection without relief. He reports the Naproxen upset his stomach. He recently had an MRI and is here today for those results.     Allergies   Allergen Reactions   • Penicillins Anaphylaxis        Social History     Socioeconomic History   • Marital status:      Spouse name: Not on file   • Number of children: 2   • Years of education: Not on file   • Highest education level: Not on file   Tobacco Use   • Smoking status: Former Smoker     Packs/day: 1.00     Years: 25.00     Pack years: 25.00     Types: Cigarettes     Quit date: 11/2017     Years since quitting: 3.9   • Smokeless tobacco: Never Used        Review of Systems     Objective   Vital Signs:   Resp 14   Ht 177.8 cm (70\")   Wt 102 kg (225 lb)   BMI 32.28 kg/m²       Physical Exam  Constitutional:       Appearance: Normal appearance. The patient is well-developed and normal weight.   HENT:      Head: Normocephalic.      Right Ear: Hearing and external ear normal.      Left Ear: Hearing and external ear normal.      Nose: Nose normal.   Eyes:      Conjunctiva/sclera: Conjunctivae normal.   Cardiovascular:      Rate and Rhythm: Normal rate.   Pulmonary:      Effort: Pulmonary effort is normal.      Breath sounds: No wheezing or rales.   Abdominal:      Palpations: Abdomen is soft.      Tenderness: There is no abdominal tenderness.   Musculoskeletal:      Cervical back: Normal range of motion.   Skin:     Findings: No rash.   Neurological:      Mental Status: The patient is alert and oriented to person, place, and time.   Psychiatric:    "      Mood and Affect: Mood and affect normal.         Judgment: Judgment normal.       Ortho Exam      Left elbow- No elbow tenderness to elbow or biceps tendon. Tender to dorsal radial forearm. Pain with resisted and active movement of the wrist. Elbow ROM 0-145 degrees. Neurovascularly intact.     Procedures      Imaging Results (Most Recent)     None           Result Review :       MRI Radius Ulna Left Without Contrast    Result Date: 10/1/2021  Narrative: PROCEDURE: MRI RADIUS ULNA LEFT WO CONTRAST  COMPARISON: None  INDICATIONS: left forearm tendonitis  TECHNIQUE: A variety of imaging planes and parameters were utilized for visualization of suspected pathology.  Images were performed without contrast. FINDINGS:  No fracture or malalignment is seen.  Fluid is noted surrounding the distal aspect of the biceps tendon consistent with moderate bicipitoradial bursitis.  The tendon itself appears intact.  The brachialis and triceps tendons appear unremarkable.  The common flexor and common extensor tendons appear normal.  The radial and ulnar collateral ligaments are intact.   No muscle body atrophy or edema is seen.  No mass is evident.  The ulnar nerve and other contents of the cubital tunnel appear normal.  There is mild subchondral T2 high signal in the capitellum consistent with early osteoarthritis.  Cartilage in the joint appears intact.  No significant joint effusion or loose body is evident.  CONCLUSION:  1. Moderate b icipitoradial bursitis. 2. Early osteoarthritic changes      Timmy Sol M.D.       Electronically Signed and Approved By: Timmy Sol M.D. on 10/01/2021 at 21:11                      Assessment and Plan     DX: Left bicep bursitis     Discussed the treatment plan with the patient.  Order for physical therapy given today. Order for biceps injection by radiologist under ultrasound given today.   Prescription for Diclofenac gel given today.     Call or return if worsening symptoms.    Follow  Up     6 weeks after injection.       Patient was given instructions and counseling regarding his condition or for health maintenance advice. Please see specific information pulled into the AVS if appropriate.     Scribed for Dion Cartagena MD by Dona Laguerre.  10/06/21   14:53 EDT    I have personally performed the services described in this document as scribed by the above individual and it is both accurate and complete. Dion Cartagena MD 10/06/21

## 2021-10-14 ENCOUNTER — HOSPITAL ENCOUNTER (OUTPATIENT)
Dept: INTERVENTIONAL RADIOLOGY/VASCULAR | Facility: HOSPITAL | Age: 41
Discharge: HOME | End: 2021-10-14
Admitting: ORTHOPAEDIC SURGERY

## 2021-10-14 ENCOUNTER — APPOINTMENT (OUTPATIENT)
Dept: INTERVENTIONAL RADIOLOGY/VASCULAR | Facility: HOSPITAL | Age: 41
End: 2021-10-14

## 2021-10-14 VITALS
OXYGEN SATURATION: 97 % | HEART RATE: 69 BPM | RESPIRATION RATE: 16 BRPM | SYSTOLIC BLOOD PRESSURE: 115 MMHG | DIASTOLIC BLOOD PRESSURE: 71 MMHG

## 2021-10-14 DIAGNOSIS — M71.9 BURSITIS OF OTHER SITE: ICD-10-CM

## 2021-10-14 DIAGNOSIS — M25.522 LEFT ELBOW PAIN: ICD-10-CM

## 2021-10-14 PROCEDURE — 77002 NEEDLE LOCALIZATION BY XRAY: CPT

## 2021-10-14 PROCEDURE — 25010000002 IOPAMIDOL 61 % SOLUTION: Performed by: ORTHOPAEDIC SURGERY

## 2021-10-14 PROCEDURE — 25010000002 METHYLPREDNISOLONE PER 40 MG

## 2021-10-14 RX ORDER — BUPIVACAINE HYDROCHLORIDE 5 MG/ML
INJECTION, SOLUTION EPIDURAL; INTRACAUDAL
Status: COMPLETED
Start: 2021-10-14 | End: 2021-10-14

## 2021-10-14 RX ORDER — METHYLPREDNISOLONE ACETATE 40 MG/ML
INJECTION, SUSPENSION INTRA-ARTICULAR; INTRALESIONAL; INTRAMUSCULAR; SOFT TISSUE
Status: COMPLETED
Start: 2021-10-14 | End: 2021-10-14

## 2021-10-14 RX ORDER — LIDOCAINE HYDROCHLORIDE 20 MG/ML
INJECTION, SOLUTION INFILTRATION; PERINEURAL
Status: COMPLETED
Start: 2021-10-14 | End: 2021-10-14

## 2021-10-14 RX ADMIN — METHYLPREDNISOLONE ACETATE 1 MG: 40 INJECTION, SUSPENSION INTRA-ARTICULAR; INTRALESIONAL; INTRAMUSCULAR; SOFT TISSUE at 15:05

## 2021-10-14 RX ADMIN — LIDOCAINE HYDROCHLORIDE 10 ML: 20 INJECTION, SOLUTION INFILTRATION; PERINEURAL at 15:00

## 2021-10-14 RX ADMIN — BUPIVACAINE HYDROCHLORIDE 5 ML: 5 INJECTION, SOLUTION EPIDURAL; INTRACAUDAL; PERINEURAL at 15:05

## 2021-10-14 RX ADMIN — IOPAMIDOL 15 ML: 612 INJECTION, SOLUTION INTRATHECAL at 15:03

## 2021-10-14 NOTE — NURSING NOTE
"Dr. Sol to rad holding for assistance for vital signs on patient who fainted during SI. When RN arrived pt sitting in chair, diaphoretic. VSS. Per pt \"I hate needles and I knew this was going to happen\". Pt to rad holding area for close monitoring.   "

## 2022-01-17 PROCEDURE — U0004 COV-19 TEST NON-CDC HGH THRU: HCPCS | Performed by: NURSE PRACTITIONER

## 2022-08-08 ENCOUNTER — OFFICE VISIT (OUTPATIENT)
Dept: FAMILY MEDICINE CLINIC | Age: 42
End: 2022-08-08

## 2022-08-08 VITALS
BODY MASS INDEX: 32.21 KG/M2 | WEIGHT: 225 LBS | OXYGEN SATURATION: 98 % | DIASTOLIC BLOOD PRESSURE: 85 MMHG | RESPIRATION RATE: 17 BRPM | HEIGHT: 70 IN | SYSTOLIC BLOOD PRESSURE: 121 MMHG | HEART RATE: 87 BPM

## 2022-08-08 DIAGNOSIS — G89.29 CHRONIC MIDLINE LOW BACK PAIN WITHOUT SCIATICA: Primary | ICD-10-CM

## 2022-08-08 DIAGNOSIS — M54.50 CHRONIC MIDLINE LOW BACK PAIN WITHOUT SCIATICA: Primary | ICD-10-CM

## 2022-08-08 PROCEDURE — 96372 THER/PROPH/DIAG INJ SC/IM: CPT | Performed by: NURSE PRACTITIONER

## 2022-08-08 PROCEDURE — 99213 OFFICE O/P EST LOW 20 MIN: CPT | Performed by: NURSE PRACTITIONER

## 2022-08-08 RX ORDER — METHYLPREDNISOLONE 4 MG/1
TABLET ORAL
Qty: 21 EACH | Refills: 0 | Status: SHIPPED | OUTPATIENT
Start: 2022-08-08

## 2022-08-08 RX ORDER — KETOROLAC TROMETHAMINE 30 MG/ML
60 INJECTION, SOLUTION INTRAMUSCULAR; INTRAVENOUS ONCE
Status: COMPLETED | OUTPATIENT
Start: 2022-08-08 | End: 2022-08-08

## 2022-08-08 RX ORDER — TIZANIDINE HYDROCHLORIDE 4 MG/1
4 CAPSULE, GELATIN COATED ORAL 3 TIMES DAILY PRN
Qty: 40 CAPSULE | Refills: 0 | Status: SHIPPED | OUTPATIENT
Start: 2022-08-08

## 2022-08-08 RX ORDER — DICLOFENAC SODIUM 75 MG/1
75 TABLET, DELAYED RELEASE ORAL 2 TIMES DAILY
Qty: 40 TABLET | Refills: 0 | Status: SHIPPED | OUTPATIENT
Start: 2022-08-08

## 2022-08-08 RX ADMIN — KETOROLAC TROMETHAMINE 60 MG: 30 INJECTION, SOLUTION INTRAMUSCULAR; INTRAVENOUS at 11:58

## 2022-08-08 NOTE — PROGRESS NOTES
"Chief Complaint  Back Pain (Chronic, low bilateral back pain recent exacerbation over the last 4 days. No recent injury. Reports painful to sit down. Has history of injuries and imaging completed. No physical therapy completed. Does report used to go to chiropractor with some relief until the provider passed away. )    Subjective        Darrell Paz presents to [facility]  Back Pain  This is a recurrent problem. The current episode started in the past 7 days. The problem occurs intermittently. The problem has been gradually worsening since onset. The pain is present in the lumbar spine. The quality of the pain is described as aching and shooting. The pain does not radiate. The pain is at a severity of 7/10. The pain is moderate. The symptoms are aggravated by position. Pertinent negatives include no numbness or tingling. He has tried chiropractic manipulation for the symptoms. The treatment provided no relief.       Objective   Vital Signs:  /85 (BP Location: Right arm, Patient Position: Standing, Cuff Size: Large Adult)   Pulse 87   Resp 17   Ht 177.8 cm (70\")   Wt 102 kg (225 lb)   SpO2 98% Comment: Room air  BMI 32.28 kg/m²   Estimated body mass index is 32.28 kg/m² as calculated from the following:    Height as of this encounter: 177.8 cm (70\").    Weight as of this encounter: 102 kg (225 lb).          Physical Exam  HENT:      Head: Normocephalic.   Cardiovascular:      Rate and Rhythm: Normal rate.   Pulmonary:      Effort: Pulmonary effort is normal.   Musculoskeletal:      Lumbar back: Spasms and tenderness present.   Skin:     General: Skin is warm and dry.   Neurological:      Mental Status: He is alert and oriented to person, place, and time.   Psychiatric:         Mood and Affect: Mood normal.        Result Review :                Assessment and Plan   Diagnoses and all orders for this visit:    1. Chronic midline low back pain without sciatica " (Primary)  Comments:  Do not begin diclofenac until medrol dose pack is complete. Follow up in two weeks if no improvement for possible PT or pain management referral  Orders:  -     methylPREDNISolone (MEDROL) 4 MG dose pack; Take as directed on package instructions.  Dispense: 21 each; Refill: 0  -     ketorolac (TORADOL) injection 60 mg  -     diclofenac (VOLTAREN) 75 MG EC tablet; Take 1 tablet by mouth 2 (Two) Times a Day.  Dispense: 40 tablet; Refill: 0  -     TiZANidine (ZANAFLEX) 4 MG capsule; Take 1 capsule by mouth 3 (Three) Times a Day As Needed for Muscle Spasms.  Dispense: 40 capsule; Refill: 0             Follow Up   Return if symptoms worsen or fail to improve.  Patient was given instructions and counseling regarding his condition or for health maintenance advice. Please see specific information pulled into the AVS if appropriate.